# Patient Record
Sex: FEMALE | URBAN - METROPOLITAN AREA
[De-identification: names, ages, dates, MRNs, and addresses within clinical notes are randomized per-mention and may not be internally consistent; named-entity substitution may affect disease eponyms.]

---

## 2019-11-07 ENCOUNTER — TELEPHONE (OUTPATIENT)
Dept: TRANSPLANT | Facility: CLINIC | Age: 29
End: 2019-11-07

## 2019-11-07 ENCOUNTER — MEDICAL CORRESPONDENCE (OUTPATIENT)
Dept: HEALTH INFORMATION MANAGEMENT | Facility: CLINIC | Age: 29
End: 2019-11-07

## 2019-11-07 NOTE — TELEPHONE ENCOUNTER
"[facility] B[facility]ZE  [id number]      LIVING LIVER DONOR EVALUATION  Donor First Name Enriqueta Donor MRN    Donor Middle Name  Completed 2019 11:23 AM   Donor Last Name Green Record Id [id number]    1990     BREEZE Screen PASSED     Intended Recipient  Recipient First Name Sheryl Relationship Not related (Other)   Recipient Last Name Abelardo Recipient Diagnosis    Recipient  2019 Recipient Insurance Provider    Recipient MRN  Recipient Insurance Type    Recipient Height      Recipient Weight      Recipient's ABO      Donor Information  Age 29 Race Not Specified   Ht 152 cm (5' 0'') Ethnicity /   Wt 52.1 kg (115 lbs) Preferred Language English   BMI 22.50 kg/m   Required No   Gender Female Blood Type O   Demographics  Home Address [address] [place] [address] # [phone]   [facility] Type Mobile   State [place] Alternate #    Zip Code [address] Type    [place] [facility] Preferred Contact day Mon, Fri, Thur, Wed, Tue   Email [email or web address] Preferred Contact time 11:00 AM-1:00 PM, 1:00 PM-4:00 PM, 09:00 AM-11:00 AM   &&   Donor's Medical Information  Medical History History of pregnancy   Kidney Stones Medications None Reported   Surgical History Tubal Ligation   Uterine and/or Ovarian Surgery, NOS Allergies NKDA   Social History EtOH: Denies   Illicit Drug Use: Denies   Tobacco: Denies Self-Reported Functional Status \"I am able to participate in strenuous sports such as swimming, singles tennis, football, basketball, or skiing\"   Family Medical History Cancer (denies)   Clots or Clotting Disease (denies)   Diabetes (denies)   Heart Disease (denies)   Hypertension (denies)   Inflammatory Bowel Disease (denies)   Liver Disease (denies)   Mental Illness (denies) Exercise Frequency Exercise (3 X per week)   Review of Organ Systems  Review of Systems Airway or Lungs: No   Blood Disorder: No   Cancer: No   Diabetes,Thyroid,Adrenal,Endocrine Disorder: No "   Digestive or Liver: No   Female Health: Yes   Heart or Circulatory System: No   Immune Diseases: No   Kidneys and Bladder: Yes   Muscles,Bones,Joints: No   Neuro: No   Psych: No   &&   Donor's Social Information  Marital Status  Level of Education Graduate or professional degree complete   Medical Insurance Status Has medical insurance Employment Status Full Time   Living Accommodation 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 (NO)   Commercial sex < 12 months. (NO)   Illicit IV drug use < 5yrs. (NO)   Other high risk sexual contact < 12 months. (NO)     Reason for Donation  Referral Friend or Family of Tx Candidate Reason for Donation Being a mother of 3, I would pray that if any of my children were in the need of organ donation someone would be there for them. 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   Permission to Disclose Inquiry  Patient Comments    Donor Motivation Highly motivated donor     PCP Contact  PCP Name Thomas Gallegos   PCP [facility]   PCP [place]   PCP Phone [phone]   Emergency Contact  First Name Reji First Name    Last Name Surekha Last Name    Phone # [phone] Phone #    Phone Type Mobile Phone Type    Relationship Spouse Relationship    Office Use  Reviewed By    Reviewed 11/7/2019   Admin Folder Archive   Comments    Lost for Followup    Extended Comments    BREEZE ID [email or web address].combined:[id number]   survey status completed   Activity History  Call  Task    Due Date 11/6/2019   Last Modified Date/Time 11/6/2019 2:36 PM   Comments

## 2019-11-07 NOTE — TELEPHONE ENCOUNTER
Is abo O. Went to school with Mom. Lives in [place].Hx kidney stones x3--last one was 1 month ago.Passes stones on own. Added to surgery hx list:had Tubal Ligation, the a reversal was done.Will send info/forms.